# Patient Record
Sex: FEMALE | Race: WHITE | NOT HISPANIC OR LATINO | URBAN - NONMETROPOLITAN AREA
[De-identification: names, ages, dates, MRNs, and addresses within clinical notes are randomized per-mention and may not be internally consistent; named-entity substitution may affect disease eponyms.]

---

## 2018-04-26 PROBLEM — R00.2 PALPITATIONS: Status: ACTIVE | Noted: 2017-04-24

## 2018-04-26 PROBLEM — R10.10 PAIN OF UPPER ABDOMEN: Status: ACTIVE | Noted: 2017-04-24

## 2018-04-26 PROBLEM — I49.1 PREMATURE ATRIAL BEATS: Status: ACTIVE | Noted: 2017-04-24

## 2018-04-27 ENCOUNTER — OFFICE VISIT (OUTPATIENT)
Dept: FAMILY MEDICINE CLINIC | Facility: CLINIC | Age: 63
End: 2018-04-27

## 2018-04-27 ENCOUNTER — TELEPHONE (OUTPATIENT)
Dept: FAMILY MEDICINE CLINIC | Facility: CLINIC | Age: 63
End: 2018-04-27

## 2018-04-27 VITALS
WEIGHT: 145.7 LBS | HEART RATE: 76 BPM | SYSTOLIC BLOOD PRESSURE: 163 MMHG | HEIGHT: 63 IN | OXYGEN SATURATION: 95 % | DIASTOLIC BLOOD PRESSURE: 74 MMHG | BODY MASS INDEX: 25.82 KG/M2

## 2018-04-27 DIAGNOSIS — I10 HYPERTENSION, BENIGN: ICD-10-CM

## 2018-04-27 DIAGNOSIS — Z13.220 SCREENING FOR CHOLESTEROL LEVEL: ICD-10-CM

## 2018-04-27 DIAGNOSIS — Z87.891 HISTORY OF TOBACCO USE: ICD-10-CM

## 2018-04-27 DIAGNOSIS — E78.5 HYPERLIPIDEMIA, UNSPECIFIED HYPERLIPIDEMIA TYPE: Primary | ICD-10-CM

## 2018-04-27 DIAGNOSIS — Z12.31 SCREENING MAMMOGRAM, ENCOUNTER FOR: ICD-10-CM

## 2018-04-27 DIAGNOSIS — Z11.59 NEED FOR HEPATITIS C SCREENING TEST: ICD-10-CM

## 2018-04-27 PROCEDURE — 90471 IMMUNIZATION ADMIN: CPT | Performed by: FAMILY MEDICINE

## 2018-04-27 PROCEDURE — 90732 PPSV23 VACC 2 YRS+ SUBQ/IM: CPT | Performed by: FAMILY MEDICINE

## 2018-04-27 PROCEDURE — 99213 OFFICE O/P EST LOW 20 MIN: CPT | Performed by: FAMILY MEDICINE

## 2018-04-27 RX ORDER — LEVOTHYROXINE SODIUM 0.1 MG/1
100 TABLET ORAL DAILY
Qty: 30 TABLET | Refills: 2 | Status: SHIPPED | OUTPATIENT
Start: 2018-04-27 | End: 2018-05-01 | Stop reason: SDUPTHER

## 2018-04-27 RX ORDER — SIMVASTATIN 10 MG
10 TABLET ORAL NIGHTLY
Qty: 30 TABLET | Refills: 2 | Status: SHIPPED | OUTPATIENT
Start: 2018-04-27

## 2018-04-27 RX ORDER — PANTOPRAZOLE SODIUM 40 MG/1
40 TABLET, DELAYED RELEASE ORAL DAILY
Qty: 30 TABLET | Refills: 2 | Status: SHIPPED | OUTPATIENT
Start: 2018-04-27

## 2018-04-27 RX ORDER — TOPIRAMATE 25 MG/1
25 TABLET ORAL DAILY
Qty: 30 TABLET | Refills: 3 | Status: SHIPPED | OUTPATIENT
Start: 2018-04-27 | End: 2022-01-10

## 2018-04-27 RX ORDER — MECLIZINE HYDROCHLORIDE 25 MG/1
25 TABLET ORAL 3 TIMES DAILY PRN
Qty: 90 TABLET | Refills: 2 | Status: SHIPPED | OUTPATIENT
Start: 2018-04-27

## 2018-04-27 RX ORDER — DICYCLOMINE HYDROCHLORIDE 10 MG/1
10 CAPSULE ORAL
Qty: 120 CAPSULE | Refills: 11 | Status: SHIPPED | OUTPATIENT
Start: 2018-04-27 | End: 2019-04-28

## 2018-04-27 RX ORDER — LOSARTAN POTASSIUM AND HYDROCHLOROTHIAZIDE 12.5; 5 MG/1; MG/1
1 TABLET ORAL DAILY
Qty: 30 TABLET | Refills: 2 | Status: SHIPPED | OUTPATIENT
Start: 2018-04-27

## 2018-04-27 RX ORDER — NICOTINE 21 MG/24HR
1 PATCH, TRANSDERMAL 24 HOURS TRANSDERMAL EVERY 24 HOURS
Qty: 28 PATCH | Refills: 2 | Status: SHIPPED | OUTPATIENT
Start: 2018-04-27 | End: 2019-12-14

## 2018-04-27 RX ORDER — ONDANSETRON 4 MG/1
TABLET, ORALLY DISINTEGRATING ORAL
COMMUNITY
Start: 2018-03-14 | End: 2018-04-27 | Stop reason: SDUPTHER

## 2018-04-27 RX ORDER — CYCLOBENZAPRINE HCL 10 MG
10 TABLET ORAL NIGHTLY PRN
COMMUNITY
End: 2022-01-10

## 2018-04-27 RX ORDER — ONDANSETRON 4 MG/1
4 TABLET, ORALLY DISINTEGRATING ORAL EVERY 8 HOURS PRN
Qty: 90 TABLET | Refills: 2 | Status: SHIPPED | OUTPATIENT
Start: 2018-04-27 | End: 2019-09-28

## 2018-04-27 NOTE — PROGRESS NOTES
I have seen the patient.  I have reviewed the notes, assessments, and/or procedures performed by Jeremy Spencer MD , I concur with her/his documentation and assessment and plan for Vianey BROCK Hartman.          This document has been electronically signed by Kathrine Richardson MD on April 27, 2018 11:26 AM

## 2018-04-27 NOTE — PROGRESS NOTES
ID: Vianey Hartman    CC:   Chief Complaint   Patient presents with   • Establish Care   • Hypertension   • Thyroid Problem   • Back Pain       Subjective:     HPI     Vianey Hartman is a 62 y.o. female who presents for Initial evaluation to establish care.  Patient mentions that she has a concurrent medical history of migraine disorder, chronic lower back pain, hypertension, GERD and is here to establish care.  Patient mentions that her previous physician left the practice and she is in need for a new physician.  Patient states that she's been doing good on her current medication loss of talked about her lower back pain.  Patient states her previous physician had her on Norco and she is wondering if we could fill that medication.  Indicated to the patient that we don't do chronic pain at this practice but I would be happy to refer the patient pain management.  Patient states that she would prefer to stay local.  Patient denies any other acute complaints or concerns at this at this time.  Migraine disorder: patient mentions that she has been dealing with migraines for several years and states that recently she only gets one migraine per year and it has been well controlled with topamax  Tobacco dependence syndrome: patient states that she currently smokes a pack per day and is interested in quitting, states that the nicotine patch has worked well for her in the past and would like to try it at this moment  GERD: patient states that she has GERD and it has been a chronic issue that she has been dealing with for the last 5 years and that protonix has been helpful as far as controlling the symptoms  Screening mammogram: patient mentions that she has not had a mammogram in the past 5 years and is interested in having one ordered   Preventative:  Over the past 2 weeks, have you felt down, depressed, or hopeless?No   Over the past 2 weeks, have you felt little interest or pleasure in doing things?no  Clinical  depression screening refused by patient.No     On osteoporosis therapy?No     Past Medical Hx:  Past Medical History:   Diagnosis Date   • Blood in feces    • Hypertensive disorder    • Visit for gynecologic examination        Past Surgical Hx:  Past Surgical History:   Procedure Laterality Date   • ENDOSCOPY  04/2012   • HYSTERECTOMY     • OOPHORECTOMY      removed at different times by laparotomy   • UPPER GASTROINTESTINAL ENDOSCOPY      Esophagus endoscopy, repair (1)        Health Maintenance:  Health Maintenance   Topic Date Due   • ANNUAL PHYSICAL  05/25/1958   • PNEUMOCOCCAL VACCINE (19-64 MEDIUM RISK) (1 of 1 - PPSV23) 05/25/1975   • TDAP/TD VACCINES (1 - Tdap) 05/25/1975   • HEPATITIS C SCREENING  04/26/2018   • MAMMOGRAM  04/26/2018   • PAP SMEAR  04/26/2018   • ZOSTER VACCINE  04/26/2018   • LIPID PANEL  04/27/2018   • INFLUENZA VACCINE  08/01/2018   • COLONOSCOPY  04/27/2028       Current Meds:    Current Outpatient Prescriptions:   •  aspirin 81 MG EC tablet, Take 81 mg by mouth., Disp: , Rfl:   •  colestipol (COLESTID) 1 g tablet, Take 1 g by mouth., Disp: , Rfl:   •  cyclobenzaprine (FLEXERIL) 10 MG tablet, Take 10 mg by mouth At Night As Needed for Muscle Spasms., Disp: , Rfl:   •  dicyclomine (BENTYL) 10 MG capsule, Take 1 capsule by mouth 4 (Four) Times a Day Before Meals & at Bedtime., Disp: 120 capsule, Rfl: 11  •  HYDROcodone-acetaminophen (NORCO)  MG per tablet, Take 1 tablet by mouth., Disp: , Rfl:   •  levothyroxine (SYNTHROID, LEVOTHROID) 100 MCG tablet, Take 1 tablet by mouth Daily., Disp: 30 tablet, Rfl: 2  •  losartan-hydrochlorothiazide (HYZAAR) 50-12.5 MG per tablet, Take 1 tablet by mouth Daily., Disp: 30 tablet, Rfl: 2  •  meclizine (ANTIVERT) 25 MG tablet, Take 1 tablet by mouth 3 (Three) Times a Day As Needed for dizziness., Disp: 90 tablet, Rfl: 2  •  ondansetron ODT (ZOFRAN-ODT) 4 MG disintegrating tablet, Take 1 tablet by mouth Every 8 (Eight) Hours As Needed for Nausea or  "Vomiting., Disp: 90 tablet, Rfl: 2  •  pantoprazole (PROTONIX) 40 MG EC tablet, Take 1 tablet by mouth Daily., Disp: 30 tablet, Rfl: 2  •  simvastatin (ZOCOR) 10 MG tablet, Take 1 tablet by mouth Every Night., Disp: 30 tablet, Rfl: 2  •  topiramate (TOPAMAX) 25 MG tablet, Take 1 tablet by mouth Daily., Disp: 30 tablet, Rfl: 3  •  nicotine (NICODERM CQ) 21 MG/24HR patch, Place 1 patch on the skin Daily., Disp: 28 patch, Rfl: 2    Allergies:  Bee venom and Penicillins    Family Hx:  No family history on file.     Social History:  Social History     Social History   • Marital status:      Spouse name: N/A   • Number of children: N/A   • Years of education: N/A     Occupational History   • Not on file.     Social History Main Topics   • Smoking status: Current Every Day Smoker     Types: Cigarettes   • Smokeless tobacco: Not on file   • Alcohol use Not on file   • Drug use: Unknown   • Sexual activity: Not on file     Other Topics Concern   • Not on file     Social History Narrative   • No narrative on file       Review of Systems      Constitutional: no weight loss, fever, chills, weakness  HEENT: no visual loss, blurred vision, double vision, yellow scalarea  Skin: no rash, no discoloration   CVS: no chest pain, palpitations  Chest: no shortness of air, cough, dyspnea  GI: no abdominal pain, blood in stool, no nausea, vomiting, diarrhea  : no burning in urination, change in odor, no change in frequency  Neuro: no headache, dizziness, syncope, paralysis, ataxia, numbness  Musculoskeletal: chronic lower back pain  Lymphatics: no enlarged nodes  Endo: no reports of sweating, cold or heat intolerance, no polyuria        Objective:     /74 (BP Location: Left arm, Patient Position: Sitting, Cuff Size: Adult)   Pulse 76   Ht 160 cm (63\")   Wt 66.1 kg (145 lb 11.2 oz)   SpO2 95%   BMI 25.81 kg/m²     Physical Exam      CONSTITUTIONAL: The vital signs showed that the patient was afebrile; blood pressure and " heart rate were within normal limits. The patient appeared alert.  EYES: The conjunctiva was clear. The pupil was equal and reactive. There was no ptosis.   EARS, NOSE AND THROAT: The ears and the nose appeared normal in appearance. Hearing was grossly intact. The oropharynx showed that the mucosa was moist. There was no lesion that I could see in the palate, tongue. tonsil or posterior pharynx.  NECK: The neck was supple. The thyroid gland was not enlarged by palpation.  RESPIRATORY: The patient’s respiratory effort was normal. Auscultation of the lung showed it to be clear with good air movement.  CARDIOVASCULAR: Auscultation of the heart revealed S1 and S2 with regular rate with no murmur noted. The extremities showed no edema.  PSYCHIATRIC: The patient was oriented to time, place and person.        Assessment/Plan:     Vianey was seen today for establish care, hypertension, thyroid problem and back pain.    Diagnoses and all orders for this visit:    Hyperlipidemia, unspecified hyperlipidemia type    Hypertension, benign    Need for hepatitis C screening test  -     Hepatitis C Antibody    Screening for cholesterol level  -     Lipid panel    History of tobacco use  -     Pneumococcal Polysaccharide Vaccine 23-Valent (PPSV23) Greater Than or Equal To 3yo Subcutaneous / IM    Other orders  -     topiramate (TOPAMAX) 25 MG tablet; Take 1 tablet by mouth Daily.  -     simvastatin (ZOCOR) 10 MG tablet; Take 1 tablet by mouth Every Night.  -     pantoprazole (PROTONIX) 40 MG EC tablet; Take 1 tablet by mouth Daily.  -     ondansetron ODT (ZOFRAN-ODT) 4 MG disintegrating tablet; Take 1 tablet by mouth Every 8 (Eight) Hours As Needed for Nausea or Vomiting.  -     nicotine (NICODERM CQ) 21 MG/24HR patch; Place 1 patch on the skin Daily.  -     meclizine (ANTIVERT) 25 MG tablet; Take 1 tablet by mouth 3 (Three) Times a Day As Needed for dizziness.  -     losartan-hydrochlorothiazide (HYZAAR) 50-12.5 MG per tablet; Take 1  tablet by mouth Daily.  -     levothyroxine (SYNTHROID, LEVOTHROID) 100 MCG tablet; Take 1 tablet by mouth Daily.  -     dicyclomine (BENTYL) 10 MG capsule; Take 1 capsule by mouth 4 (Four) Times a Day Before Meals & at Bedtime.          Follow-up:     Return in about 3 months (around 7/27/2018).      Goals:continue medication  Barriers to goals:none     Health Maintenance   Topic Date Due   • ANNUAL PHYSICAL  05/25/1958   • PNEUMOCOCCAL VACCINE (19-64 MEDIUM RISK) (1 of 1 - PPSV23) 05/25/1975   • TDAP/TD VACCINES (1 - Tdap) 05/25/1975   • HEPATITIS C SCREENING  04/26/2018   • MAMMOGRAM  04/26/2018   • PAP SMEAR  04/26/2018   • ZOSTER VACCINE  04/26/2018   • LIPID PANEL  04/27/2018   • INFLUENZA VACCINE  08/01/2018   • COLONOSCOPY  04/27/2028       Smoking cessation counseling was provided.  does not drink  eat more fruits and vegetables    RISK SCORE: 3          This document has been electronically signed by Jeremy Spencer MD on April 27, 2018 3:34 PM

## 2018-04-27 NOTE — TELEPHONE ENCOUNTER
PHARMACY CALLED WANTING TO VERIFY DOSAGE INSTRUCTIONS ON DICYCLOMINE. PHARMACY HAS 4 TIMES DAILY AND AT BEDTIME, WANTS TO KNOW IF THAT'S CORRECT OR JUST 4 TIMES DAILY

## 2018-04-30 ENCOUNTER — TELEPHONE (OUTPATIENT)
Dept: FAMILY MEDICINE CLINIC | Facility: CLINIC | Age: 63
End: 2018-04-30

## 2018-04-30 NOTE — TELEPHONE ENCOUNTER
PT CALLED AND SAYS SHE GOT ALL HER PRESCRIPTIONS EXCEPT, GABAPENTIN AND SOMETHING TO HELP HER SLEEP.    ALSO ASKING ABOUT THE REFERRAL TO THE PAIN CLINIC HERE IN [place].

## 2018-05-01 ENCOUNTER — TELEPHONE (OUTPATIENT)
Dept: FAMILY MEDICINE CLINIC | Facility: CLINIC | Age: 63
End: 2018-05-01

## 2018-05-01 RX ORDER — LEVOTHYROXINE SODIUM 0.1 MG/1
100 TABLET ORAL DAILY
Qty: 30 TABLET | Refills: 2 | Status: SHIPPED | OUTPATIENT
Start: 2018-05-01 | End: 2022-01-10

## 2018-05-01 NOTE — TELEPHONE ENCOUNTER
PT CALLED TO LET DR KNOW HER PHARMACY NEVER RECEIVED ANYTHING FOR HER NERVES, NEURONTIN OR THYROID MEDICATION. PLEASE SEND TO [facility] IN [place]

## 2018-05-24 ENCOUNTER — TELEPHONE (OUTPATIENT)
Dept: FAMILY MEDICINE CLINIC | Facility: CLINIC | Age: 63
End: 2018-05-24

## 2018-05-24 DIAGNOSIS — G89.29 CHRONIC BILATERAL LOW BACK PAIN WITHOUT SCIATICA: Primary | ICD-10-CM

## 2018-05-24 DIAGNOSIS — M54.50 CHRONIC BILATERAL LOW BACK PAIN WITHOUT SCIATICA: Primary | ICD-10-CM

## 2018-05-24 NOTE — TELEPHONE ENCOUNTER
Pt called said that last visit she was supposed to have been ref to pain management and she has not heard anything from you or them about this ref. She wanted to check on the status of that being done. She was told that it was due to her insurance.

## 2021-05-10 ENCOUNTER — TRANSCRIBE ORDERS (OUTPATIENT)
Dept: CT IMAGING | Facility: CLINIC | Age: 66
End: 2021-05-10

## 2021-05-10 DIAGNOSIS — F17.210 NICOTINE DEPENDENCE, CIGARETTES, UNCOMPLICATED: Primary | ICD-10-CM

## 2021-12-09 DIAGNOSIS — R07.9 CHEST PAIN, UNSPECIFIED TYPE: Primary | ICD-10-CM

## 2022-01-24 ENCOUNTER — APPOINTMENT (OUTPATIENT)
Dept: NUCLEAR MEDICINE | Facility: HOSPITAL | Age: 67
End: 2022-01-24

## 2022-01-24 ENCOUNTER — HOSPITAL ENCOUNTER (OUTPATIENT)
Dept: CARDIOLOGY | Facility: HOSPITAL | Age: 67
Discharge: HOME | End: 2022-01-24

## 2022-02-01 ENCOUNTER — APPOINTMENT (OUTPATIENT)
Dept: CARDIOLOGY | Facility: HOSPITAL | Age: 67
End: 2022-02-01

## 2022-02-01 ENCOUNTER — APPOINTMENT (OUTPATIENT)
Dept: NUCLEAR MEDICINE | Facility: HOSPITAL | Age: 67
End: 2022-02-01

## 2022-02-09 NOTE — H&P
Cardiology History and Physical Note        Patient Name: Vianey Hartman  Age/Sex: 66 y.o. female  : 1955  MRN: [id number]    Date of Admission : 2022    Primary care Physician: Provider, No Known    Reason for Admission: Lexiscan Cardiolite stress test      Subjective:       Chief Complaint: Chest pain    History of Present Illness:  Vianey Hartman is a 66 y.o. 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    Patient presents to establish further cardiac care.  Patient complains of having symptoms of sharp right-sided chest discomfort.  Patient complains of having rib cage pain.  Patient has had decreased appetite.  Patient does complain of having episodes of palpitation.  Patient has been active and takes care of the grandbaby.    Patient does have residual right-sided weakness.  Patient complains of having episodes of headache.  Patient does admit on eating a lot of salt.    Patient resting electrocardiogram done reveals sinus rhythm with nonspecific ST-T wave changes    Patient blood pressure 150/80 pulse of 61 respiration of 18 oxygen saturation of 100%.    Patient 10 point review of system except for what stated in the history of present is negative      Concurrent Medical History:  1.  Chest  pain.  2.  Atherosclerotic coronary artery disease.  3.  Non-Q myocardial infarction in March 2020.  4.  Coronary artery bypass grafting done in March 2020 with two-vessel bypass grafting by Dr. Ayala with:  A.  LIMA to the LAD.  B.  Saphenous vein graft to the obtuse marginal branch.  C.  Left atrial appendage ligation.  5.  Arterial hypertension.  6.  Hypertensive heart disease.  7.  Ischemic cardiomyopathy with an ejection fraction of 35 to 40%.  8.  Hyperlipidemia.  9.  Chronic obstructive lung disease.  10.  History of anemia requiring packed red blood cell transfusion.  11.  Cerebrovascular accident with residual right-sided hemiparesis.  12.  Bilateral nonobstructive carotid stenosis.  13.  Gastroesophageal reflux disease.  14.  History of bronchitis      Past Surgical History:  1.  Coronary angiogram done in March 2020.  2.  Coronary artery bypass grafting done in March 2020 with two-vessel bypass grafting done in [place] by Dr. Ayala.  3.  Hysterectomy.  4.  Oophorectomy.  5.  Colonoscopy.  6.  Esophagogastroduodenoscopy      Family History: Octavia history significant for father and mother who had coronary artery disease and sister who has coronary artery disease      Social History: 1 pack/day tobacco abuse denies any alcohol intake.       Cardiac Risk factor:   1.  Postmenopausal.  2.  Arterial hypertension.  3.  Hyperlipidemia.  4.  Tobacco abuse.  5.  Family history for coronary artery disease    Allergies:  Allergies   Allergen Reactions   • Bee Venom Hives and Shortness Of Breath   • Penicillins Unknown (See Comments)       Home Medication::  Prior to Admission medications    Medication Sig Start Date End Date Taking? Authorizing Provider   albuterol sulfate  (90 Base) MCG/ACT inhaler Inhale 2 puffs Every 4 (Four) Hours As Needed for Wheezing or Shortness of Air. 12/14/19   Mindi Summers APRN   aspirin 81 MG EC tablet Take 81 mg by mouth.    Provider, MD Sánchez   furosemide  (LASIX) 20 MG tablet  10/4/21   Emergency, Nurse Joshua, RN   gabapentin (NEURONTIN) 300 MG capsule  11/10/21   Emergency, Nurse Joshua RN   HYDROcodone-acetaminophen (NORCO)  MG per tablet Take 1 tablet by mouth.    Provider, MD Sánchez   losartan-hydrochlorothiazide (HYZAAR) 50-12.5 MG per tablet Take 1 tablet by mouth Daily. 4/27/18   Jeremy Spencer MD   meclizine (ANTIVERT) 25 MG tablet Take 1 tablet by mouth 3 (Three) Times a Day As Needed for dizziness. 4/27/18   Jeremy Spencer MD   pantoprazole (PROTONIX) 40 MG EC tablet Take 1 tablet by mouth Daily. 4/27/18   Jeremy Spencer MD   potassium chloride (K-DUR,KLOR-CON) 20 MEQ CR tablet  10/14/21   Emergency, Nurse KISHAN Lewis   simvastatin (ZOCOR) 10 MG tablet Take 1 tablet by mouth Every Night. 4/27/18   Jeremy Spencer MD         Review of Systems   Constitutional: Positive for fatigue. Negative for chills, fever and unexpected weight change.   HENT: Negative for hearing loss and nosebleeds.    Eyes: Negative for visual disturbance.   Respiratory: Positive for chest tightness. Negative for cough, shortness of breath and wheezing.    Cardiovascular: Positive for chest pain. Negative for palpitations and leg swelling.   Gastrointestinal: Negative for abdominal pain, blood in stool, constipation, diarrhea, nausea and vomiting.   Endocrine: Negative for cold intolerance, heat intolerance, polydipsia, polyphagia and polyuria.   Genitourinary: Negative for hematuria.   Musculoskeletal: Positive for arthralgias and back pain. Negative for joint swelling, myalgias and neck pain.   Skin: Negative for color change, rash and wound.   Neurological: Negative for dizziness, seizures, syncope, light-headedness, numbness and headaches.   Hematological: Does not bruise/bleed easily.         Objective:     There were no vitals taken for this visit.  150/80 pulse of 61 respiration of 18 oxygen saturation of 100%  Constitutional:       Appearance:  Well-developed.   Eyes:      General: No scleral icterus.     Conjunctiva/sclera: Conjunctivae normal.      Pupils: Pupils are equal, round, and reactive to light.   HENT:      Head: Normocephalic and atraumatic.      Left Ear: External ear normal.      Nose: Nose normal.   Neck:      Thyroid: No thyromegaly.      Vascular: No JVD.      Trachea: No tracheal deviation.      Lymphadenopathy: No cervical adenopathy.   Pulmonary:      Breath sounds: Normal breath sounds. No stridor.   Cardiovascular:      Normal rate. Regular rhythm.   Abdominal:      General: Bowel sounds are normal.   Musculoskeletal: Normal range of motion.      Cervical back: Normal range of motion and neck supple. Skin:     General: Skin is warm and dry.   Neurological:      Mental Status: Alert and oriented to person, place, and time.      Deep Tendon Reflexes: Reflexes are normal and symmetric.   Psychiatric:         Behavior: Behavior normal.         Thought Content: Thought content normal.         Judgment: Judgment normal.         Lab Review:           Invalid input(s): LABALBU, PROT                                    EKG:   ECG/EMG Results (last 24 hours)     ** No results found for the last 24 hours. **          Imaging:  Imaging Results (Last 24 Hours)     ** No results found for the last 24 hours. **          I personally viewed and interpreted the patient's EKG/Telemetry data.    Assessment:        1. R07.9 - Chest pain, unspecified     2. I25.10 - Atherosclerotic heart disease of native coronary artery without angina pectoris     3. I25.810 - Atherosclerosis of coronary artery bypass graft(s) without angina pectoris     4. R06.00 - Dyspnea, unspecified     5. I10 - Essential (primary) hypertension     6. I50.22 - Chronic systolic (congestive) heart failure     7. I34.8 - Other nonrheumatic mitral valve disorders     8. I36.8 - Other nonrheumatic tricuspid valve disorders     9. F17.200 - Nicotine dependence, unspecified, uncomplicated      10. E78.49 - Other hyperlipidemia      Plan:   1.  Chest pain.  Patient does have history of documented atherosclerotic coronary artery disease with previous coronary artery bypass grafting done in March 2020 with two-vessel bypass grafting done in [place].  Patient now complains of having symptoms of substernal chest pain.  Patient at the present time has been recommended to undergo myocardial perfusion Lexiscan Cardiolite stress test.  Risk-benefit treatment option for the myocardial perfusion Lexiscan Cardiolite stress test were discussed with the patient and an informed consent was obtained.    Myocardial perfusion scintigraphy (MPS)  was recommended to the patient as the best non-invasive modality for the assessment of obstructive CAD.  I  did spend some time discussing the procedure, as well as risks and benefits.  I also informed the patient that the risk of nonfatal MI or major cardiac complication is about  0.02%. The patient was also informed about the risks and benefits of MPS. Patient was also provided with a handout describing the test, as well as patient instructions prior to testing.      I also discussed the results of MPS as divided into risks.The NPV of this test is as high as 98%.  I also specifically discussed the risk of cardiac death with the following percentages:    Low-risk MPS:                          0.5% per year  Mildly abnormal MPS:              2.7%  Moderately abnormal:             2.9%  Severely abnormal:                 4.2%    2.  Arterial hypertension.  Patient blood pressure is been labile and elevated.  Patient has been counseled to decrease her salt intake and to continue with the present dose of the losartan HCTZ along with the carvedilol.    3.  Ischemic cardiomyopathy with left ventricular systolic dysfunction with an ejection fraction of 35%.  Clinically at the present time patient is euvolemic and not in congestive heart failure.  Patient would be continued on the  present dose of the Lasix.  Patient has been recommended to undergo a transthoracic echocardiogram to evaluate the left ventricular systolic function.    4.  Hyperlipidemia.  Patient has been counseled on low-fat low-cholesterol diet and to continue with the present dose of the simvastatin.    5.  History of cerebrovascular accident post coronary artery bypass grafting with right-sided hemiparesis is noted.    6.  Peripheral vascular disease with bilateral nonobstructive carotid stenosis is noted.  Patient is on dual antiplatelets.    7.  Chronic obstructive lung disease with tobacco abuse.  Clinically at the present time patient not wheezing.    8.  Risk factor modification.  Patient has been counseled extensively to quit smoking.  Patient has been explained the risk associated with smoking and the occurrence and progression of atherosclerotic coronary artery disease and peripheral vascular disease.    The above plan of management were discussed with the patient      Time: time spent in face-to-face evaluation of greater than 55 minutes and interacting and formulating examining and discussing the plan with the patient with 50% of greater time spent in face-to-face interaction.    Electronically signed by Dustin Goldman MD, 02/09/22, 3:39 PM CST.

## 2022-02-11 ENCOUNTER — HOSPITAL ENCOUNTER (OUTPATIENT)
Dept: CARDIOLOGY | Facility: HOSPITAL | Age: 67
Discharge: HOME | End: 2022-02-11

## 2022-02-11 ENCOUNTER — APPOINTMENT (OUTPATIENT)
Dept: NUCLEAR MEDICINE | Facility: HOSPITAL | Age: 67
End: 2022-02-11

## 2022-03-15 ENCOUNTER — HOSPITAL ENCOUNTER (OUTPATIENT)
Dept: CARDIOLOGY | Facility: HOSPITAL | Age: 67
Discharge: HOME | End: 2022-03-15

## 2022-03-15 ENCOUNTER — APPOINTMENT (OUTPATIENT)
Dept: NUCLEAR MEDICINE | Facility: HOSPITAL | Age: 67
End: 2022-03-15

## 2022-03-15 ENCOUNTER — HOSPITAL ENCOUNTER (OUTPATIENT)
Dept: NUCLEAR MEDICINE | Facility: HOSPITAL | Age: 67
Discharge: HOME | End: 2022-03-15

## 2022-03-15 DIAGNOSIS — R07.9 CHEST PAIN, UNSPECIFIED TYPE: ICD-10-CM

## 2022-03-15 LAB
MAXIMAL PREDICTED HEART RATE: 155 BPM
STRESS TARGET HR: 132 BPM

## 2022-03-15 NOTE — H&P
Cardiology History and Physical Note        Patient Name: Vianey Hartman  Age/Sex: 66 y.o. female  : 1955  MRN: [id number]    Date of Admission : 3/15/2022    Primary care Physician: Mitul Prado APRN    Reason for Admission: Chest pain history of atherosclerotic coronary artery disease Lexiscan Cardiolite stress test      Subjective:       Chief Complaint: Chest pain    History of Present Illness:  Vianey Hartman is a 66 y.o. 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     Patient presents to establish further cardiac care.  Patient complains of having symptoms of sharp right-sided chest discomfort.  Patient complains of having rib cage pain.  Patient has had decreased appetite.  Patient does complain of having episodes of palpitation.  Patient has been active and takes care of the grandbaby.     Patient does have residual right-sided weakness.  Patient complains of having episodes of headache.  Patient does admit on eating a lot of salt.     Patient resting electrocardiogram done reveals sinus rhythm with nonspecific ST-T wave changes     Patient blood pressure 150/80 pulse of 61 respiration of 18 oxygen saturation of 100%.     Patient 10 point review of system except for what stated in the history of  present is negative        Concurrent Medical History:  1.  Chest pain.  2.  Atherosclerotic coronary artery disease.  3.  Non-Q myocardial infarction in March 2020.  4.  Coronary artery bypass grafting done in March 2020 with two-vessel bypass grafting by Dr. Ayala with:  A.  LIMA to the LAD.  B.  Saphenous vein graft to the obtuse marginal branch.  C.  Left atrial appendage ligation.  5.  Arterial hypertension.  6.  Hypertensive heart disease.  7.  Ischemic cardiomyopathy with an ejection fraction of 35 to 40%.  8.  Hyperlipidemia.  9.  Chronic obstructive lung disease.  10.  History of anemia requiring packed red blood cell transfusion.  11.  Cerebrovascular accident with residual right-sided hemiparesis.  12.  Bilateral nonobstructive carotid stenosis.  13.  Gastroesophageal reflux disease.  14.  History of bronchitis        Past Surgical History:  1.  Coronary angiogram done in March 2020.  2.  Coronary artery bypass grafting done in March 2020 with two-vessel bypass grafting done in [place] by Dr. Ayala.  3.  Hysterectomy.  4.  Oophorectomy.  5.  Colonoscopy.  6.  Esophagogastroduodenoscopy        Family History: Octavia history significant for father and mother who had coronary artery disease and sister who has coronary artery disease        Social History: 1 pack/day tobacco abuse denies any alcohol intake.        Cardiac Risk factor:   1.  Postmenopausal.  2.  Arterial hypertension.  3.  Hyperlipidemia.  4.  Tobacco abuse.  5.  Family history for coronary artery disease  Allergies:  Allergies   Allergen Reactions   • Bee Venom Hives and Shortness Of Breath   • Penicillins Unknown (See Comments)       Home Medication::  Prior to Admission medications    Medication Sig Start Date End Date Taking? Authorizing Provider   albuterol sulfate  (90 Base) MCG/ACT inhaler Inhale 2 puffs Every 4 (Four) Hours As Needed for Wheezing or Shortness of Air. 12/14/19   Mindi Summers, JW   aspirin 81 MG EC  tablet Take 81 mg by mouth.    Provider, MD Sánchez   furosemide (LASIX) 20 MG tablet  10/4/21   Emergency, Nurse Joshua RN   gabapentin (NEURONTIN) 300 MG capsule  11/10/21   Emergency, Nurse Joshua RN   HYDROcodone-acetaminophen (NORCO)  MG per tablet Take 1 tablet by mouth.    Provider, MD Sánchez   losartan-hydrochlorothiazide (HYZAAR) 50-12.5 MG per tablet Take 1 tablet by mouth Daily. 4/27/18   Jeremy Spencer MD   meclizine (ANTIVERT) 25 MG tablet Take 1 tablet by mouth 3 (Three) Times a Day As Needed for dizziness. 4/27/18   Jeremy Spencer MD   pantoprazole (PROTONIX) 40 MG EC tablet Take 1 tablet by mouth Daily. 4/27/18   Jeremy Spencer MD   potassium chloride (K-DUR,KLOR-CON) 20 MEQ CR tablet  10/14/21   Emergency, Nurse KISHAN Lewis   simvastatin (ZOCOR) 10 MG tablet Take 1 tablet by mouth Every Night. 4/27/18   Jeremy Spencer MD         Review of Systems   Constitutional: Positive for fatigue. Negative for chills, fever and unexpected weight change.   HENT: Negative for hearing loss and nosebleeds.    Eyes: Negative for visual disturbance.   Respiratory: Positive for chest tightness and shortness of breath. Negative for cough and wheezing.    Cardiovascular: Positive for chest pain. Negative for palpitations and leg swelling.   Gastrointestinal: Negative for abdominal pain, blood in stool, constipation, diarrhea, nausea and vomiting.   Endocrine: Negative for cold intolerance, heat intolerance, polydipsia, polyphagia and polyuria.   Genitourinary: Negative for hematuria.   Musculoskeletal: Positive for arthralgias and back pain. Negative for joint swelling, myalgias and neck pain.   Skin: Negative for color change, rash and wound.   Neurological: Negative for dizziness, seizures, syncope, light-headedness, numbness and headaches.   Hematological: Does not bruise/bleed easily.         Objective:     There were no vitals taken for this visit.  Pulse of 61 respiration of 18  oxygen saturation 100% and a blood pressure 145/75  Constitutional:       Appearance: Well-developed.   Eyes:      General: No scleral icterus.     Conjunctiva/sclera: Conjunctivae normal.      Pupils: Pupils are equal, round, and reactive to light.   HENT:      Head: Normocephalic and atraumatic.      Left Ear: External ear normal.      Nose: Nose normal.   Neck:      Thyroid: No thyromegaly.      Vascular: No JVD.      Trachea: No tracheal deviation.      Lymphadenopathy: No cervical adenopathy.   Pulmonary:      Breath sounds: Normal breath sounds. No stridor.   Cardiovascular:      Normal rate. Regular rhythm.      Murmurs:  Regular S1 and S2 with a soft systolic murmur best heard at the apex   Abdominal:      General: Bowel sounds are normal.   Musculoskeletal: Normal range of motion.      Cervical back: Normal range of motion and neck supple. Skin:     General: Skin is warm and dry.   Neurological:      Mental Status: Alert and oriented to person, place, and time.      Deep Tendon Reflexes: Reflexes are normal and symmetric.   Psychiatric:         Behavior: Behavior normal.         Thought Content: Thought content normal.         Judgment: Judgment normal.         Lab Review:           Invalid input(s): LABALBURITA                                    EKG:   ECG/EMG Results (last 24 hours)     ** No results found for the last 24 hours. **          Imaging:  Imaging Results (Last 24 Hours)     ** No results found for the last 24 hours. **          I personally viewed and interpreted the patient's EKG/Telemetry data.    Assessment:   1.  Chest pain.  2.  Atherosclerotic coronary artery disease with previous coronary artery bypass grafting.  3.  Arterial hypertension.  4.  Hypertensive heart disease.  5.  Ischemic cardiomyopathy with chronic systolic heart failure.  6.  Tobacco abuse.  7.  Hyperlipidemia.  8.  Nonrheumatic mitral and tricuspid regurgitation          Plan:   1.  Chest pain.  Patient does have  history of documented atherosclerotic coronary artery disease with previous coronary artery bypass grafting done in March 2020 with two-vessel bypass grafting done in [place].  Patient now complains of having symptoms of substernal chest pain.  Patient at the present time has been recommended to undergo myocardial perfusion Lexiscan Cardiolite stress test.  Risk-benefit treatment option for the myocardial perfusion Lexiscan Cardiolite stress test were discussed with the patient and an informed consent was obtained.     Myocardial perfusion scintigraphy (MPS)  was recommended to the patient as the best non-invasive modality for the assessment of obstructive CAD.  I  did spend some time discussing the procedure, as well as risks and benefits.  I also informed the patient that the risk of nonfatal MI or major cardiac complication is about  0.02%. The patient was also informed about the risks and benefits of MPS. Patient was also provided with a handout describing the test, as well as patient instructions prior to testing.       I also discussed the results of MPS as divided into risks.The NPV of this test is as high as 98%.  I also specifically discussed the risk of cardiac death with the following percentages:     Low-risk MPS:                          0.5% per year  Mildly abnormal MPS:              2.7%  Moderately abnormal:             2.9%  Severely abnormal:                 4.2%     2.  Arterial hypertension.  Patient blood pressure is been labile and elevated.  Patient has been counseled to decrease her salt intake and to continue with the present dose of the losartan HCTZ along with the carvedilol.     3.    Chronic systolic heart failure with ischemic cardiomyopathy with left ventricular systolic dysfunction with an ejection fraction of 35%.  Clinically at the present time patient is euvolemic and not in congestive heart failure.  Patient would be continued on the present dose of the Lasix.  Patient has been  recommended to undergo a transthoracic echocardiogram to evaluate the left ventricular systolic function.     4.  Hyperlipidemia.  Patient has been counseled on low-fat low-cholesterol diet and to continue with the present dose of the simvastatin.     5.  History of cerebrovascular accident post coronary artery bypass grafting with right-sided hemiparesis is noted.     6.  Peripheral vascular disease with bilateral nonobstructive carotid stenosis is noted.  Patient is on dual antiplatelets.     7.  Chronic obstructive lung disease with tobacco abuse.  Clinically at the present time patient not wheezing.     8.  Risk factor modification.  Patient has been counseled extensively to quit smoking.  Patient has been explained the risk associated with smoking and the occurrence and progression of atherosclerotic coronary artery disease and peripheral vascular disease.     The above plan of management were discussed with the patient      Time: time spent in face-to-face evaluation of greater than 55 minutes and interacting and formulating examining and discussing the plan with the patient with 50% of greater time spent in face-to-face interaction.    Electronically signed by Dustin Goldman MD, 03/15/22, 6:36 AM CDT.      Dictated utilizing Dragon dictation.

## 2022-03-22 ENCOUNTER — APPOINTMENT (OUTPATIENT)
Dept: CARDIOLOGY | Facility: HOSPITAL | Age: 67
End: 2022-03-22

## 2022-03-22 ENCOUNTER — APPOINTMENT (OUTPATIENT)
Dept: NUCLEAR MEDICINE | Facility: HOSPITAL | Age: 67
End: 2022-03-22

## 2022-03-25 ENCOUNTER — APPOINTMENT (OUTPATIENT)
Dept: NUCLEAR MEDICINE | Facility: HOSPITAL | Age: 67
End: 2022-03-25

## 2022-03-25 ENCOUNTER — HOSPITAL ENCOUNTER (OUTPATIENT)
Dept: CARDIOLOGY | Facility: HOSPITAL | Age: 67
Discharge: HOME | End: 2022-03-25

## 2022-03-25 ENCOUNTER — TELEPHONE (OUTPATIENT)
Dept: GENERAL RADIOLOGY | Facility: HOSPITAL | Age: 67
End: 2022-03-25

## 2022-03-25 RX ORDER — SODIUM CHLORIDE 0.9 % (FLUSH) 0.9 %
10 SYRINGE (ML) INJECTION ONCE
Status: DISCONTINUED | OUTPATIENT
Start: 2022-03-25 | End: 2022-03-28 | Stop reason: HOSPADM